# Patient Record
Sex: FEMALE | Race: WHITE | NOT HISPANIC OR LATINO | Employment: FULL TIME | URBAN - METROPOLITAN AREA
[De-identification: names, ages, dates, MRNs, and addresses within clinical notes are randomized per-mention and may not be internally consistent; named-entity substitution may affect disease eponyms.]

---

## 2023-06-02 NOTE — PROGRESS NOTES
Breast Surgical Oncology  [place]    Date of Service: 2023    SUBJECTIVE:   Chief complaint: right nipple puritis    HISTORY OF PRESENT ILLNESS:   Pricila Saavedra is a 58 y.o. female who is kindly referred by Dr. Delfina Arroyo for right nipple puritis.    She complains of daily right nipple puritis over the past 6 months. [facility] prescribed topical kenalog in January which gave temporary improvement in her symptoms.  She denies breast concerns such as pain, masses, skin changes, nipple retraction or lumps under the arm. She has experienced occasional spontaneous left clear nipple discharge in the past. Her recent screening mammography was normal.     Her breast cancer risk factor profile is as follows: Menarche at 12, Menopause at 52.  She is . Age at first live birth was 42. Family history of cancer is as follows: brother with prostate cancer at age 60,     FAMILY HISTORY:     Family History   Problem Relation Age of Onset    Hypertension Mother     Hypertension Father     Heart disease Sister     Hypertension Sister     Stroke Sister     Hypertension Brother     Stroke Brother     Diabetes Maternal Grandmother     Diabetes Paternal Grandfather     Heart disease Paternal Grandfather         PAST MEDICAL HISTORY:     Past Medical History:   Diagnosis Date    ADD (attention deficit disorder)     Anxiety     Apnea     Bulging lumbar disc     Elevated hemoglobin A1c     Herpes genitalis     HPV in female     Hypercholesteremia     Ovarian cyst, left     Pulmonary embolism 2020    Vitamin D deficiency        SURGICAL HISTORY:     Past Surgical History:   Procedure Laterality Date    AUGMENTATION OF BREAST      BACK SURGERY      1 lumbar disectomy and 1 lumbar fusion     SECTION      DILATION AND CURETTAGE OF UTERUS      LUMBAR DISC SURGERY      NECK SURGERY      OTHER SURGICAL HISTORY  2016    small polyp    OTHER SURGICAL HISTORY  2017    gastric balloon    SHOULDER  SURGERY Right        SOCIAL HISTORY:     Social History     Tobacco Use    Smoking status: Never    Smokeless tobacco: Never   Substance Use Topics    Alcohol use: Yes    Drug use: Never        MEDICATIONS/ALLERGIES:     Current Outpatient Medications:     atorvastatin (LIPITOR) 10 MG tablet, Take 10 mg by mouth every morning., Disp: , Rfl:     buPROPion (WELLBUTRIN XL) 300 MG 24 hr tablet, Take 300 mg by mouth every morning., Disp: , Rfl:     clonazePAM (KLONOPIN) 1 MG tablet, Take 1 mg by mouth nightly as needed., Disp: , Rfl:     diclofenac sodium (VOLTAREN) 1 % Gel, Apply 2 g topically 4 (four) times daily as needed., Disp: , Rfl:     EScitalopram oxalate (LEXAPRO) 10 MG tablet, Take 10 mg by mouth every morning., Disp: , Rfl:     fluticasone propionate (FLONASE) 50 mcg/actuation nasal spray, SHAKE GENTLY AND SPRAY 2 SPRAYS IN EACH NOSTRIL ONCE PER DAY FOR 10 DAYS., Disp: , Rfl:     triamcinolone acetonide 0.025% (KENALOG) 0.025 % cream, 1 application 2 (two) times daily. Apply to affected area, Disp: , Rfl:     valACYclovir (VALTREX) 1000 MG tablet, Take 1,000 mg by mouth 2 (two) times daily., Disp: , Rfl:   Review of patient's allergies indicates:  No Known Allergies    REVIEW OF SYSTEMS:   I have reviewed 12 systems, including 2 points per system. Pertinent reported positives are: night sweats, /she had intolerance, frequent urination, easy bruising/bleeding, history of blood clots, anxiety, depression, joint pain/stiffness, back pain, decreased libido, vaginal dryness, tinnitus    PHYSICAL EXAM:   General: The patient appears well and is in no acute distress.     Chaperon present for examination.   BREAST EXAM  No Asymmetry  Bilateral implants in place  Right:  - Mass: No  - Skin change: eczematous changes to her nipple, more prominent Villegas tubercles compared to left breast  - Nipple Discharge: No  - Nipple retraction: No  - Axillary LAD: No  Left:   - Mass: No  - Skin change: No  - Nipple Discharge:  No  - Nipple retraction: No  - Axillary LAD: No    IMAGING:   Images were personally reviewed.   Results for orders placed in visit on 05/18/23    Mammo Digital Screening Bilat w/ Angel    Narrative  Result:  Mammo Digital Screening Bilat w/ Angel    History:  Patient is 58 y.o. and is seen for a screening mammogram.    Films Compared:  Prior images (if available) were compared.    Findings:  This procedure was performed using tomosynthesis. Computer-aided detection was utilized in the interpretation of this examination.  The breasts are heterogeneously dense, which may obscure small masses. There is no evidence of suspicious masses, calcifications, or other abnormal findings.    Impression  Bilateral  There is no mammographic evidence of malignancy.    BI-RADS Category:  Overall: 2 - Benign      Recommendation:  Routine screening mammogram in 1 year is recommended.      PATHOLOGY:   none    ASSESSMENT:     1. Nipple symptom or sign in female    2. Physiological nipple discharge          PLAN:     We discussed that clear nipple discharge is physiologic in nature. No treatment is necessary. Her screening mammogram is normal and reassurance was given. Her physical exam and puritis is consistent with mild dermal eczema of the nipple. There is no concerning exam findings suggestive of cancer. I recommend resuming topical Kenalog routinely for relief of symptoms. Should it persist beyond 4 weeks with topical treatment, an evaluation by dermatology would be warranted.     I spent a total of 45 minutes on this visit. This includes face to face time and non-face to face time preparing to see the patient (eg, review of tests), obtaining and/or reviewing separately obtained history, documenting clinical information in the electronic or other health record, independently interpreting results and communicating results to the patient/family/caregiver, or care coordinator.      Rocío Payne M.D.

## 2023-06-05 ENCOUNTER — OFFICE VISIT (OUTPATIENT)
Dept: SURGERY | Facility: CLINIC | Age: 58
End: 2023-06-05
Payer: COMMERCIAL

## 2023-06-05 DIAGNOSIS — N64.59 NIPPLE SYMPTOM OR SIGN IN FEMALE: Primary | ICD-10-CM

## 2023-06-05 DIAGNOSIS — N64.52 PHYSIOLOGICAL NIPPLE DISCHARGE: ICD-10-CM

## 2023-06-05 PROCEDURE — 99999 PR PBB SHADOW E&M-EST. PATIENT-LVL III: CPT | Mod: PBBFAC,,, | Performed by: SURGERY

## 2023-06-05 PROCEDURE — 99244 PR OFFICE CONSULTATION,LEVEL IV: ICD-10-PCS | Mod: S$GLB,,, | Performed by: SURGERY

## 2023-06-05 PROCEDURE — 99999 PR PBB SHADOW E&M-EST. PATIENT-LVL III: ICD-10-PCS | Mod: PBBFAC,,, | Performed by: SURGERY

## 2023-06-05 PROCEDURE — 99244 OFF/OP CNSLTJ NEW/EST MOD 40: CPT | Mod: S$GLB,,, | Performed by: SURGERY
